# Patient Record
Sex: FEMALE | Race: WHITE | NOT HISPANIC OR LATINO | Employment: OTHER | URBAN - METROPOLITAN AREA
[De-identification: names, ages, dates, MRNs, and addresses within clinical notes are randomized per-mention and may not be internally consistent; named-entity substitution may affect disease eponyms.]

---

## 2017-01-09 ENCOUNTER — TELEPHONE (OUTPATIENT)
Dept: CARDIOLOGY | Facility: CLINIC | Age: 82
End: 2017-01-09

## 2017-01-09 NOTE — TELEPHONE ENCOUNTER
Kristina called stating pt prescripts were being sent to two different pharmacies. Advised refill today was sent to [facility] on [address] only. Thanks, Jane

## 2017-01-11 RX ORDER — APIXABAN 2.5 MG/1
TABLET, FILM COATED ORAL
Qty: 60 TABLET | Refills: 0 | OUTPATIENT
Start: 2017-01-11

## 2017-02-08 RX ORDER — APIXABAN 2.5 MG/1
TABLET, FILM COATED ORAL
Qty: 60 TABLET | Refills: 0 | Status: SHIPPED | OUTPATIENT
Start: 2017-02-08 | End: 2017-03-08 | Stop reason: SDUPTHER

## 2017-03-09 RX ORDER — APIXABAN 2.5 MG/1
TABLET, FILM COATED ORAL
Qty: 60 TABLET | Refills: 2 | Status: SHIPPED | OUTPATIENT
Start: 2017-03-09 | End: 2017-06-08 | Stop reason: SDUPTHER

## 2017-04-25 ENCOUNTER — OFFICE VISIT (OUTPATIENT)
Dept: CARDIOLOGY | Facility: CLINIC | Age: 82
End: 2017-04-25

## 2017-04-25 VITALS
HEART RATE: 76 BPM | WEIGHT: 102 LBS | BODY MASS INDEX: 17.42 KG/M2 | DIASTOLIC BLOOD PRESSURE: 70 MMHG | SYSTOLIC BLOOD PRESSURE: 110 MMHG | HEIGHT: 64 IN

## 2017-04-25 DIAGNOSIS — I48.19 PERSISTENT ATRIAL FIBRILLATION (HCC): Primary | ICD-10-CM

## 2017-04-25 DIAGNOSIS — I36.1 NON-RHEUMATIC TRICUSPID VALVE INSUFFICIENCY: ICD-10-CM

## 2017-04-25 DIAGNOSIS — I34.0 NON-RHEUMATIC MITRAL REGURGITATION: ICD-10-CM

## 2017-04-25 DIAGNOSIS — I50.32 CHRONIC DIASTOLIC CONGESTIVE HEART FAILURE (HCC): ICD-10-CM

## 2017-04-25 PROCEDURE — 93000 ELECTROCARDIOGRAM COMPLETE: CPT | Performed by: INTERNAL MEDICINE

## 2017-04-25 PROCEDURE — 99213 OFFICE O/P EST LOW 20 MIN: CPT | Performed by: INTERNAL MEDICINE

## 2017-04-25 NOTE — PROGRESS NOTES
Subjective:     Encounter Date:04/25/2017      Patient ID: Oneyda Johnson is a 85 y.o. female.    Chief Complaint: The patient is an 85-year-old female with a history of CML, Sjogrens disease, persistent atrial fibrillation and mitral regurgitation, resolved nonischemic cardiomyopathy, chronic diastolic congestive heart failure and pulmonary hypertension, who presents for a followup.  The patient was previously followed by Dr. Varela with [facility] for a new diagnosis of atrial fibrillation. Around the time of her diagnosis she underwent a stress test that was negative for ischemia. Also around that time in 10/2014 she was started on Lopressor and tried on warfarin, and then was ultimately switched to rivaroxaban for anticoagulation. She subsequently had an echocardiogram done in 06/2015 that showed an ejection fraction of 30-35%, severe mitral regurgitation, severe tricuspid regurgitation, along with severe pulmonary hypertension.  She was informed of the results and was told that she may need surgery for her valve issues. Unfortunately, the patient took the news badly and ended up stopping all of her medications.      I started seeing the patient in 09/2015 when she presented with symptoms of dyspnea on exertion and orthopnea.  She appeared to be in acute congestive heart failure when I saw her so I started her on Lasix, restarted her Lopressor for her atrial fibrillation with RVR.  After a discussion regarding anticoagulation, we did end up starting her back on Rivaroxaban, but she developed some issues with her liver enzymes and we eventually switched her to apixaban.   With outpatient management, her volume overload improved and I ended up setting her up for a transesophageal echocardiogram that was performed in 10/2015 to evaluate her mitral regurgitation.   On this study, her ejection fraction was around 40%.  She had mildly reduced RV function with an RV systolic pressure of 30 mmHg  and mild to moderate TR, and moderate mitral regurgitation. As part of an ischemic workup for her heart failure, she underwent a right and left heart catheterization later that same month that showed normal pulmonary pressures and coronary arteries.   At this point I adjusted her medical management by switching her from metoprolol to carvedilol.  We were unable to add an ace inhibitor because of borderline low blood pressures.   With optimal medical management we repeated an echocardiogram in 06/2016 that showed normalization of her left ventricle function to 54%, with borderline dilated right ventricle, dilated left and right atrium, mild to moderate mitral regurgitation and RVSP of 55 mmHg.       Shortly after that the patient ended up being admitted to [facility] for recurrent right sided effusion. She had echocardiograms done there that showed her ejection fraction to be a little bit lower at 50%, and she had markedly elevated pulmonary pressures with an RVSP of 88 mmHg.  She underwent thoracentesis and eventually was sent home with home O2.      When I saw her last in followup she was starting to come off of her home O2.  She had no other significant changes in her medication management.      Today, the patient presents for a routine followup.   Overall, she is doing pretty well.  She does continue to have some dyspnea on exertion, but she has not required any further oxygen.  She denies any chest pain, she denies any palpitations.  She does have some lightheadedness with position changes.  She is on a stable dose of furosemide. She is tolerating her carvedilol and remains on apixaban.   She recently had lab work done through Dr. Valnecia office.  She has had no other significant changes in her medications.        History of Present Illness        Review of Systems   Constitution: Negative for weakness and malaise/fatigue.   HENT: Negative for headaches, hearing loss, hoarse voice, nosebleeds and sore  throat.    Eyes: Negative for pain.   Cardiovascular: Positive for dyspnea on exertion. Negative for chest pain, claudication, cyanosis, irregular heartbeat, leg swelling, near-syncope, orthopnea, palpitations, paroxysmal nocturnal dyspnea and syncope.   Respiratory: Negative for shortness of breath and snoring.    Endocrine: Negative for cold intolerance, heat intolerance, polydipsia, polyphagia and polyuria.   Skin: Negative for itching and rash.   Musculoskeletal: Negative for arthritis, falls, joint pain, joint swelling, muscle cramps, muscle weakness and myalgias.   Gastrointestinal: Negative for constipation, diarrhea, dysphagia, heartburn, hematemesis, hematochezia, melena, nausea and vomiting.   Genitourinary: Positive for frequency. Negative for hematuria and hesitancy.   Neurological: Positive for numbness. Negative for excessive daytime sleepiness, dizziness and light-headedness.   Psychiatric/Behavioral: Negative for depression. The patient is not nervous/anxious.           Current Outpatient Prescriptions:   •  bosutinib (BOSULIF) 100 MG chemo tablet, Take  by mouth daily., Disp: , Rfl:   •  Calcium Carbonate (CALCIUM 600 PO), Take 1 tablet by mouth daily., Disp: , Rfl:   •  carvedilol (COREG) 3.125 MG tablet, Take 1 tablet by mouth 2 (two) times a day., Disp: 60 tablet, Rfl: 11  •  cevimeline (EVOXAC) 30 MG capsule, Take 1 capsule by mouth 2 (two) times a day.  , Disp: , Rfl:   •  clindamycin (CLEOCIN) 300 MG capsule, Take by mouth. Take 2 capsules 1 hour prior to dental appointment., Disp: , Rfl:   •  ELIQUIS 2.5 MG tablet tablet, TAKE 1 TABLET BY MOUTH TWICE DAILY, Disp: 60 tablet, Rfl: 2  •  esomeprazole (NEXIUM) 40 MG packet, Take 40 mg by mouth every morning before breakfast., Disp: , Rfl:   •  FERROUS SULFATE PO, Take 65 mg by mouth., Disp: , Rfl:   •  furosemide (LASIX) 40 MG tablet, Take 1 tablet by mouth Daily., Disp: , Rfl:   •  hydroxychloroquine (PLAQUENIL) 200 MG tablet, Take 1 tablet by  "mouth 2 (two) times a day., Disp: , Rfl:   •  levothyroxine (SYNTHROID, LEVOTHROID) 25 MCG tablet, Take 25 mcg by mouth daily.  , Disp: , Rfl: 3  •  mirtazapine (REMERON) 15 MG tablet, Take 15 mg by mouth nightly.  , Disp: , Rfl: 12  •  vitamin B-12 (CYANOCOBALAMIN) 1000 MCG tablet, Take 1 tablet by mouth daily. As directed., Disp: , Rfl:     Past Medical History:   Diagnosis Date   • Atrial fibrillation    • Cancer     Leukemia   • CHF (congestive heart failure)    • Chronic kidney disease    • History of EKG    • Hypertension     Pulmonay   • Mitral regurgitation    • Mitral valve insufficiency    • Myelogenous leukemia    • Recurrent pleural effusion on right    • Systolic CHF    • Tricuspid valve insufficiency      Past Surgical History:   Procedure Laterality Date   • JOINT REPLACEMENT      Total Hip   • TOE ARTHROPLASTY      For Hammertoe     Family History   Problem Relation Age of Onset   • No Known Problems Mother    • No Known Problems Father    • Heart disease Maternal Aunt      Social History   Substance Use Topics   • Smoking status: Former Smoker   • Smokeless tobacco: Never Used      Comment: Quit in 1999  / Daily caffeine use   • Alcohol use Yes      Comment: one a day           ECG 12 Lead  Date/Time: 4/25/2017 12:12 PM  Performed by: KENISHA OVALLE  Authorized by: KENISHA OVALLE   Comparison: compared with previous ECG   Similar to previous ECG  Rhythm: atrial fibrillation  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 She appears well-developed and well-nourished.   HENT:   Head: Normocephalic and atraumatic.   Eyes: Conjunctivae, EOM and lids are normal. Pupils are equal, round, and reactive to light. "   Neck: Normal range of motion and full passive range of motion without pain. Neck supple. No JVD present. Carotid bruit is not present.   Cardiovascular: Normal rate, S1 normal and S2 normal.  An irregularly irregular rhythm present. Exam reveals no gallop.    No murmur heard.  Pulses:       Radial pulses are 2+ on the right side, and 2+ on the left side.   No bilateral lower extremity edema   Pulmonary/Chest: Effort normal and breath sounds normal.   Abdominal: Soft. Normal appearance.   Lymphadenopathy:     She has no cervical adenopathy.   Neurological: She is alert and oriented to person, place, and time.   Skin: Skin is warm, dry and intact.   Psychiatric: She has a normal mood and affect.       Lab Review:       Assessment:          Diagnosis Plan   1. Persistent atrial fibrillation     2. Non-rheumatic mitral regurgitation     3. Chronic diastolic congestive heart failure     4. Non-rheumatic tricuspid valve insufficiency            Plan:        1. Chronic diastolic congestive heart failure.  Volume status appears stable on her current management, including carvedilol and furosemide.     2. Persistent atrial fibrillation.  She remains in atrial fibrillation today. I think this is becoming more persistent. She is well rate controlled on low dose carvedilol. She is on apixaban for her CHADS-VASc score of 5 at 2.5 mg twice a day due to her weight if less than 60 kilograms, her age greater than 80, and her creatinine of greater than 1.5.     3. Mitral regurgitation.  This is noted to be mild to moderate on her last echocardiogram.     4. Chronic kidney disease. I was able to find labs from 03/01/2017 that show her creatinine is fairly stable at 1.6.  This is all by Dr. Gutiérrez.     5. CML.  She is followed by Hematology for this.          I will plan on seeing the patient back again in six months.         Atrial Fibrillation and Atrial Flutter  Assessment  • The patient has persistent atrial fibrillation  •  This is non-valvular in etiology  • The patient's CHADS2-VASc score is 4  • A [id number] score of 2 or more is considered a high risk for a thromboembolic event  • Apixaban prescribed    Plan  • Continue in atrial fibrillation with rate control  • Continue apixaban for antithrombotic therapy, bleeding issues discussed  • Continue beta blocker for rhythm control

## 2017-05-26 ENCOUNTER — OFFICE (OUTPATIENT)
Dept: URBAN - METROPOLITAN AREA CLINIC 75 | Facility: CLINIC | Age: 82
End: 2017-05-26
Payer: COMMERCIAL

## 2017-05-26 VITALS
WEIGHT: 97 LBS | HEIGHT: 64 IN | SYSTOLIC BLOOD PRESSURE: 120 MMHG | HEART RATE: 96 BPM | DIASTOLIC BLOOD PRESSURE: 58 MMHG

## 2017-05-26 DIAGNOSIS — R63.4 ABNORMAL WEIGHT LOSS: ICD-10-CM

## 2017-05-26 DIAGNOSIS — R13.10 DYSPHAGIA, UNSPECIFIED: ICD-10-CM

## 2017-05-26 DIAGNOSIS — R07.9 CHEST PAIN, UNSPECIFIED: ICD-10-CM

## 2017-05-26 PROCEDURE — 99214 OFFICE O/P EST MOD 30 MIN: CPT | Performed by: INTERNAL MEDICINE

## 2017-06-08 DIAGNOSIS — I48.19 PERSISTENT ATRIAL FIBRILLATION (HCC): ICD-10-CM

## 2017-06-08 DIAGNOSIS — I50.20 CONGESTIVE HEART FAILURE WITH LEFT VENTRICULAR SYSTOLIC DYSFUNCTION (HCC): ICD-10-CM

## 2017-06-08 RX ORDER — APIXABAN 2.5 MG/1
TABLET, FILM COATED ORAL
Qty: 60 TABLET | Refills: 3 | Status: SHIPPED | OUTPATIENT
Start: 2017-06-08 | End: 2017-10-06 | Stop reason: SDUPTHER

## 2017-06-08 RX ORDER — CARVEDILOL 3.12 MG/1
TABLET ORAL
Qty: 60 TABLET | Refills: 3 | Status: SHIPPED | OUTPATIENT
Start: 2017-06-08 | End: 2018-01-02 | Stop reason: SDUPTHER

## 2017-10-10 RX ORDER — APIXABAN 2.5 MG/1
TABLET, FILM COATED ORAL
Qty: 60 TABLET | Refills: 5 | Status: SHIPPED | OUTPATIENT
Start: 2017-10-10 | End: 2018-04-13 | Stop reason: SDUPTHER

## 2017-10-24 ENCOUNTER — OFFICE VISIT (OUTPATIENT)
Dept: CARDIOLOGY | Facility: CLINIC | Age: 82
End: 2017-10-24

## 2017-10-24 VITALS
WEIGHT: 100 LBS | DIASTOLIC BLOOD PRESSURE: 68 MMHG | BODY MASS INDEX: 17.07 KG/M2 | HEIGHT: 64 IN | HEART RATE: 72 BPM | SYSTOLIC BLOOD PRESSURE: 132 MMHG

## 2017-10-24 DIAGNOSIS — I34.0 NON-RHEUMATIC MITRAL REGURGITATION: ICD-10-CM

## 2017-10-24 DIAGNOSIS — I50.32 CHRONIC DIASTOLIC CONGESTIVE HEART FAILURE (HCC): ICD-10-CM

## 2017-10-24 DIAGNOSIS — I48.19 PERSISTENT ATRIAL FIBRILLATION (HCC): Primary | ICD-10-CM

## 2017-10-24 DIAGNOSIS — I36.1 NON-RHEUMATIC TRICUSPID VALVE INSUFFICIENCY: ICD-10-CM

## 2017-10-24 PROCEDURE — 93000 ELECTROCARDIOGRAM COMPLETE: CPT | Performed by: INTERNAL MEDICINE

## 2017-10-24 PROCEDURE — 99213 OFFICE O/P EST LOW 20 MIN: CPT | Performed by: INTERNAL MEDICINE

## 2017-10-24 RX ORDER — BUDESONIDE AND FORMOTEROL FUMARATE DIHYDRATE 160; 4.5 UG/1; UG/1
2 AEROSOL RESPIRATORY (INHALATION)
COMMUNITY
End: 2018-05-08 | Stop reason: ALTCHOICE

## 2017-10-24 NOTE — PROGRESS NOTES
Subjective:     Encounter Date:10/24/2017      Patient ID: Oneyda Johnson is a 85 y.o. female.    Chief Complaint:  History of Present Illness    This is a 85-year-old with a history of CML, Sjogren's disease, persistent atrial fibrillation, moderate mitral regurgitation, resolved nonischemic cardiomyopathy, chronic diastolic congestive heart failure, and pulmonary hypertension, who presents for follow-up.    The patient was previously followed [facility] following a diagnosis of atrial fibrillation.  As part of her workup she underwent a stress test in 10/2014 that was negative for ischemia.  She was started on Lopressor and tried on warfarin for anticoagulation but was ultimately switched to rivaroxaban.  An echocardiogram was subsequently performed in 6/2015 that showed moderately depressed left ventricular systolic function with an ejection fraction of 30-35%, severe mitral regurgitation, severe tricuspid regurgitation, and severe pulmonary hypertension.  The patient was informed of the results of that time and it was mentioned that she may need surgery for her valve issues.  Unfortunately, the patient at that is badly and ended up stopping all of her medications and did not follow-up.    I began seeing the patient when she presented in 9/2015 with symptoms of dyspnea on exertion and orthopnea.  She appears to be in acute congestive heart failure when I saw her at the time.  I started her on furosemide and restarted her metoprolol tartrate for better rate control.  We also discussed anticoagulation and eventually we started her back on rivaroxaban.  However she developed issues with her liver enzymes with this medication so he eventually switch her to low dose apixaban.  With outpatient management, her volume overload eventually improved.  We then pursued a transesophageal echocardiogram in 10/2015 to further evaluate her mitral regurgitation.  On her BHAKTI the ejection fraction  appeared to be around 40%, had a mildly reduced right ventricular function with an RV systolic pressure 30 mmHg, mild to moderate TR, and moderate mitral regurgitation.  Due to her persistently low EF we pursued an ischemic workup with a right and left heart catheterization.  Her pulmonary pressures were normal.  Her coronary arteries showed no coronary artery disease.  At that point I adjusted her medical management by switching her metoprolol to carvedilol.  I was unable to add ACE inhibitor therapy due to borderline low blood pressures.    Fortunately on a repeat echocardiogram in 6/2016 she had normalization of her left ventricular function with an ejection fraction of 54%.  This also showed a borderline dilated right ventricle, dilated left and right atrium, mild to moderate mitral regurgitation, and a right ventricular systolic pressure 55 mmHg.  Shortly after that she ended up being admitted to [facility] for recurrent right-sided effusion.  A repeat echocardiogram done there showed her ejection fraction to be low normal at 50% and with markedly elevated pulmonary pressures with a RVSP of 88 mmHg.  She underwent a thoracentesis during that stay and eventually was sent home with home oxygen.    Since then she is otherwise generally well.  She was eventually weaned off of the home O2.  Today she presents for routine follow-up.  She denies any chest pain, palpitations, PND or orthopnea, presyncope or syncope, or lower extremity edema.  She has some lightheadedness on occasion especially with position changes.  She complains about the significant bruising related to her apixaban.  She occasionally gets out of breath with exertion but this is been stable and unchanged.        Review of Systems   Constitution: Negative for weakness and malaise/fatigue.   HENT: Negative for hearing loss, hoarse voice, nosebleeds and sore throat.    Eyes: Negative for pain.   Cardiovascular: Positive for dyspnea on exertion.  Negative for chest pain, claudication, cyanosis, irregular heartbeat, leg swelling, near-syncope, orthopnea, palpitations, paroxysmal nocturnal dyspnea and syncope.   Respiratory: Negative for shortness of breath and snoring.    Endocrine: Negative for cold intolerance, heat intolerance, polydipsia, polyphagia and polyuria.   Skin: Negative for itching and rash.   Musculoskeletal: Negative for arthritis, falls, joint pain, joint swelling, muscle cramps, muscle weakness and myalgias.   Gastrointestinal: Negative for constipation, diarrhea, dysphagia, heartburn, hematemesis, hematochezia, melena, nausea and vomiting.   Genitourinary: Negative for frequency, hematuria and hesitancy.   Neurological: Positive for light-headedness. Negative for excessive daytime sleepiness, dizziness, headaches and numbness.   Psychiatric/Behavioral: Negative for depression. The patient is not nervous/anxious.           Current Outpatient Prescriptions:   •  bosutinib (BOSULIF) 100 MG chemo tablet, Take  by mouth daily., Disp: , Rfl:   •  budesonide-formoterol (SYMBICORT) 160-4.5 MCG/ACT inhaler, Inhale 2 puffs 2 (Two) Times a Day., Disp: , Rfl:   •  Calcium Carbonate (CALCIUM 600 PO), Take 1 tablet by mouth daily., Disp: , Rfl:   •  carvedilol (COREG) 3.125 MG tablet, TAKE 1 TABLET BY MOUTH TWICE DAILY, Disp: 60 tablet, Rfl: 3  •  cevimeline (EVOXAC) 30 MG capsule, Take 1 capsule by mouth 2 (two) times a day.  , Disp: , Rfl:   •  cholecalciferol (VITAMIN D3) 43726 units capsule, Take 50,000 Units by mouth Daily., Disp: , Rfl:   •  ELIQUIS 2.5 MG tablet tablet, TAKE 1 TABLET BY MOUTH TWICE DAILY, Disp: 60 tablet, Rfl: 5  •  esomeprazole (NEXIUM) 40 MG packet, Take 40 mg by mouth every morning before breakfast., Disp: , Rfl:   •  furosemide (LASIX) 40 MG tablet, Take 1 tablet by mouth Daily., Disp: , Rfl:   •  hydroxychloroquine (PLAQUENIL) 200 MG tablet, Take 1 tablet by mouth 2 (two) times a day., Disp: , Rfl:   •  levothyroxine  "(SYNTHROID, LEVOTHROID) 25 MCG tablet, Take 25 mcg by mouth daily.  , Disp: , Rfl: 3  •  mirtazapine (REMERON) 15 MG tablet, Take 15 mg by mouth nightly.  , Disp: , Rfl: 12  •  vitamin B-12 (CYANOCOBALAMIN) 1000 MCG tablet, Take 1 tablet by mouth daily. As directed., Disp: , Rfl:     Past Medical History:   Diagnosis Date   • Atrial fibrillation    • Cancer     Leukemia   • CHF (congestive heart failure)    • Chronic kidney disease    • History of EKG    • Hypertension     Pulmonay   • Mitral regurgitation    • Mitral valve insufficiency    • Myelogenous leukemia    • Recurrent pleural effusion on right    • Systolic CHF    • Tricuspid valve insufficiency      Past Surgical History:   Procedure Laterality Date   • JOINT REPLACEMENT      Total Hip   • TOE ARTHROPLASTY      For Hammertoe     Family History   Problem Relation Age of Onset   • No Known Problems Mother    • No Known Problems Father    • Heart disease Maternal Aunt      Social History   Substance Use Topics   • Smoking status: Former Smoker   • Smokeless tobacco: Never Used      Comment: Quit in 1999  / Daily caffeine use   • Alcohol use Yes      Comment: one a day           ECG 12 Lead  Date/Time: 10/24/2017 2:04 PM  Performed by: KENISHA OVALLE  Authorized by: KENISHA OVALLE   Comparison: compared with previous ECG   Similar to previous ECG  Rhythm: atrial fibrillation  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 She appears well-developed and well-nourished.   HENT:   Head: Normocephalic and atraumatic.   Eyes: Conjunctivae, EOM and lids are normal. Pupils are equal, round, and reactive to light.   Neck: Normal range of motion and full passive range of motion without pain. " Neck supple. No JVD present. Carotid bruit is not present.   Cardiovascular: Normal rate, regular rhythm, S1 normal and S2 normal.  Exam reveals no gallop.    No murmur heard.  Pulses:       Radial pulses are 2+ on the right side, and 2+ on the left side.   No bilateral lower extremity edema   Pulmonary/Chest: Effort normal and breath sounds normal.   Abdominal: Soft. Normal appearance.   Lymphadenopathy:     She has no cervical adenopathy.   Neurological: She is alert and oriented to person, place, and time.   Skin: Skin is warm, dry and intact.   Psychiatric: She has a normal mood and affect.       Lab Review:       Assessment:          Diagnosis Plan   1. Persistent atrial fibrillation     2. Chronic diastolic congestive heart failure     3. Non-rheumatic mitral regurgitation     4. Non-rheumatic tricuspid valve insufficiency            Plan:       1.  Chronic diastolic congestive heart failure.  Volume status appears stable on her current management.  2.  Chronic atrial fibrillation.  She remains in atrial fibrillation today.  Heart rate is well-controlled on the low-dose of carvedilol.  She is on apixaban for a CHADS-VASc score of 5.  3.  Mitral regurgitation.  Noted to be mild to moderate on her last echocardiogram.  4.  Chronic kidney disease.  5.  Pulmonary hypertension.  In part due to her chronic diastolic congestive heart failure.  6.  CML  7.  Sjogren's disease    We will see the patient back again in 6 months.

## 2018-01-02 DIAGNOSIS — I50.20 CONGESTIVE HEART FAILURE WITH LEFT VENTRICULAR SYSTOLIC DYSFUNCTION (HCC): ICD-10-CM

## 2018-01-02 DIAGNOSIS — I48.19 PERSISTENT ATRIAL FIBRILLATION (HCC): ICD-10-CM

## 2018-01-05 RX ORDER — CARVEDILOL 3.12 MG/1
TABLET ORAL
Qty: 60 TABLET | Refills: 6 | Status: SHIPPED | OUTPATIENT
Start: 2018-01-05

## 2018-04-16 RX ORDER — APIXABAN 2.5 MG/1
TABLET, FILM COATED ORAL
Qty: 60 TABLET | Refills: 0 | Status: SHIPPED | OUTPATIENT
Start: 2018-04-16 | End: 2018-05-13 | Stop reason: SDUPTHER

## 2018-05-08 ENCOUNTER — OFFICE VISIT (OUTPATIENT)
Dept: CARDIOLOGY | Facility: CLINIC | Age: 83
End: 2018-05-08

## 2018-05-08 VITALS
HEIGHT: 64 IN | WEIGHT: 97 LBS | DIASTOLIC BLOOD PRESSURE: 78 MMHG | SYSTOLIC BLOOD PRESSURE: 124 MMHG | BODY MASS INDEX: 16.56 KG/M2 | HEART RATE: 76 BPM

## 2018-05-08 DIAGNOSIS — I34.0 NON-RHEUMATIC MITRAL REGURGITATION: ICD-10-CM

## 2018-05-08 DIAGNOSIS — I36.1 NON-RHEUMATIC TRICUSPID VALVE INSUFFICIENCY: ICD-10-CM

## 2018-05-08 DIAGNOSIS — I50.32 CHRONIC DIASTOLIC CONGESTIVE HEART FAILURE (HCC): Primary | ICD-10-CM

## 2018-05-08 DIAGNOSIS — I48.19 PERSISTENT ATRIAL FIBRILLATION (HCC): ICD-10-CM

## 2018-05-08 PROCEDURE — 93000 ELECTROCARDIOGRAM COMPLETE: CPT | Performed by: INTERNAL MEDICINE

## 2018-05-08 PROCEDURE — 99214 OFFICE O/P EST MOD 30 MIN: CPT | Performed by: INTERNAL MEDICINE

## 2018-05-08 NOTE — PROGRESS NOTES
Subjective:     Encounter Date:05/08/2018      Patient ID: Oneyda Johnson is a 86 y.o. female.    Chief Complaint:  History of Present Illness    This is a 85-year-old with a history of CML, Sjogren's disease, persistent atrial fibrillation, moderate mitral regurgitation, resolved nonischemic cardiomyopathy, chronic diastolic congestive heart failure, and pulmonary hypertension, who presents for follow-up.     The patient was previously followed by Dr. Varela following a diagnosis of atrial fibrillation.  As part of her workup she underwent a stress test in 10/2014 that was negative for ischemia.  She was started on Lopressor and tried on warfarin for anticoagulation but was ultimately switched to rivaroxaban.  An echocardiogram was subsequently performed in 6/2015 that showed moderately depressed left ventricular systolic function with an ejection fraction of 30-35%, severe mitral regurgitation, severe tricuspid regurgitation, and severe pulmonary hypertension.  The patient was informed of the results of that time and it was mentioned that she may need surgery for her valve issues.  Unfortunately, the patient did understand the information given at that time and ended up stopping all of her medications and did not follow-up.     I began seeing the patient when she was referred in 9/2015 with symptoms of dyspnea on exertion and orthopnea.  She appears to be in acute congestive heart failure when I saw her at the time so she was started her on furosemide and restarted on metoprolol tartrate for better rate control of her atrial fibrillation.  We also discussed anticoagulation and started her back on rivaroxaban but this was switched to apixaban after the patient developed issues with her liver enzymes.  With outpatient management, her volume overload eventually improved.  We then pursued a transesophageal echocardiogram in 10/2015 to further evaluate her mitral regurgitation.  On her BHAKTI the ejection fraction  appeared to be around 40%, had a mildly reduced right ventricular function with an RV systolic pressure 30 mmHg, mild to moderate TR, and moderate mitral regurgitation.  Due to her persistently low EF we pursued an ischemic workup with a right and left heart catheterization.  Her pulmonary pressures were normal.  Her coronary arteries showed no coronary artery disease.  At that point I adjusted her medical management by switching her metoprolol to carvedilol but have been unable to add ACE inhibitor therapy due to borderline low blood pressures.     Fortunately on a repeat echocardiogram in 6/2016 she had normalization of her left ventricular function with an ejection fraction of 54%.  This also showed a borderline dilated right ventricle, dilated left and right atrium, mild to moderate mitral regurgitation, and a right ventricular systolic pressure 55 mmHg.  Shortly after that she ended up being admitted to [facility] for recurrent right-sided effusion.  A repeat echocardiogram done there showed her ejection fraction to be low normal at 50% and with markedly elevated pulmonary pressures with a RVSP of 88 mmHg.  She underwent a thoracentesis during that stay and eventually was sent home with home oxygen.    Serum last in 10/2017 and she was doing well overall.  She was weaned off of oxygen and was not having any significant symptoms.  Today she presents for routine 6 month follow-up.  She reports that since the start of the year she's been having more issues with dyspnea on exertion and lower extremity edema.  She also complains of sharp stabbing pain in her bilateral feet that occurs at nighttime and prevents her from sleeping.  She saw Dr. Gutiérrez last week for these symptoms and her furosemide dosage was increased to 60 mg a day plans to follow-up again with her next week.  She reports that she has not seen any improvement in her swelling or her dyspnea with an increased dosage.  She otherwise denies any  chest pain, palpitations, PND or orthopnea, near-syncope or syncope.  She reports that she and her  are planning on moving into an assisted living care into the this week.    Review of Systems   Constitution: Negative for weakness and malaise/fatigue.   HENT: Negative for hearing loss, hoarse voice, nosebleeds and sore throat.    Eyes: Negative for pain.   Cardiovascular: Positive for dyspnea on exertion and leg swelling. Negative for chest pain, claudication, cyanosis, irregular heartbeat, near-syncope, orthopnea, palpitations, paroxysmal nocturnal dyspnea and syncope.   Respiratory: Negative for shortness of breath and snoring.    Endocrine: Negative for cold intolerance, heat intolerance, polydipsia, polyphagia and polyuria.   Skin: Negative for itching and rash.   Musculoskeletal: Negative for arthritis, falls, joint pain, joint swelling, muscle cramps, muscle weakness and myalgias.   Gastrointestinal: Negative for constipation, diarrhea, dysphagia, heartburn, hematemesis, hematochezia, melena, nausea and vomiting.   Genitourinary: Negative for frequency, hematuria and hesitancy.   Neurological: Negative for excessive daytime sleepiness, dizziness, headaches, light-headedness and numbness.        Sharp bilateral feet pain   Psychiatric/Behavioral: Negative for depression. The patient is not nervous/anxious.           Current Outpatient Prescriptions:   •  bosutinib (BOSULIF) 100 MG chemo tablet, Take  by mouth daily., Disp: , Rfl:   •  Calcium Carbonate (CALCIUM 600 PO), Take 1 tablet by mouth daily., Disp: , Rfl:   •  carvedilol (COREG) 3.125 MG tablet, TAKE 1 TABLET BY MOUTH TWICE DAILY, Disp: 60 tablet, Rfl: 6  •  cholecalciferol (VITAMIN D3) 05454 units capsule, Take 50,000 Units by mouth Daily., Disp: , Rfl:   •  ELIQUIS 2.5 MG tablet tablet, TAKE 1 TABLET BY MOUTH TWICE DAILY, Disp: 60 tablet, Rfl: 0  •  esomeprazole (NEXIUM) 40 MG packet, Take 40 mg by mouth every morning before breakfast., Disp: ,  "Rfl:   •  furosemide (LASIX) 40 MG tablet, Take 1 tablet by mouth Daily., Disp: , Rfl:   •  hydroxychloroquine (PLAQUENIL) 200 MG tablet, Take 1 tablet by mouth 2 (two) times a day., Disp: , Rfl:   •  levothyroxine (SYNTHROID, LEVOTHROID) 25 MCG tablet, Take 25 mcg by mouth daily.  , Disp: , Rfl: 3  •  mirtazapine (REMERON) 15 MG tablet, Take 15 mg by mouth nightly.  , Disp: , Rfl: 12    Past Medical History:   Diagnosis Date   • Atrial fibrillation    • Cancer     Leukemia   • CHF (congestive heart failure)    • Chronic kidney disease    • History of EKG    • Hypertension     Pulmonay   • Mitral regurgitation    • Mitral valve insufficiency    • Myelogenous leukemia    • Recurrent pleural effusion on right    • Systolic CHF    • Tricuspid valve insufficiency      Past Surgical History:   Procedure Laterality Date   • JOINT REPLACEMENT      Total Hip   • TOE ARTHROPLASTY      For Hammertoe     Family History   Problem Relation Age of Onset   • No Known Problems Mother    • No Known Problems Father    • Heart disease Maternal Aunt      Social History   Substance Use Topics   • Smoking status: Former Smoker   • Smokeless tobacco: Never Used      Comment: Quit in 1999  / Daily caffeine use   • Alcohol use Yes      Comment: one a day           ECG 12 Lead  Date/Time: 5/8/2018 3:58 PM  Performed by: KENISHA OVALLE  Authorized by: KENISHA OVALLE   Comparison: compared with previous ECG   Rhythm: atrial fibrillation  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 She appears well-developed and well-nourished.   HENT:   Head: Normocephalic and atraumatic.   Eyes: Conjunctivae, EOM and lids are normal. Pupils are equal, round, and " reactive to light.   Neck: Normal range of motion and full passive range of motion without pain. Neck supple. No JVD present. Carotid bruit is not present.   Cardiovascular: Normal rate, regular rhythm, S1 normal and S2 normal.  Exam reveals no gallop.    No murmur heard.  Pulses:       Radial pulses are 2+ on the right side, and 2+ on the left side.   No bilateral lower extremity edema   Pulmonary/Chest: Effort normal and breath sounds normal.   Abdominal: Soft. Normal appearance.   Lymphadenopathy:     She has no cervical adenopathy.   Neurological: She is alert and oriented to person, place, and time.   Skin: Skin is warm, dry and intact.   Psychiatric: She has a normal mood and affect.       Lab Review:       Assessment:          Diagnosis Plan   1. Chronic diastolic congestive heart failure     2. Persistent atrial fibrillation     3. Non-rheumatic mitral regurgitation     4. Non-rheumatic tricuspid valve insufficiency            Plan:       1.  Chronic diastolic congestive heart failure.  She is evidence of some mild volume overload.  I recommended that she try increasing her furosemide to 40 mg twice a day for the next 3-4 days to see if this will help with her pedal edema and her dyspnea.  She has follow-up already with Dr. Gutiérrez with labs.  2.  Chronic atrial fibrillation.  Remains well rate controlled on carvedilol.  She is on chronic anticoagulation with apixaban.  3.  Mitral regurgitation.  Mild to moderate her most recent echocardiogram.  4.  Chronic kidney disease.  Has follow-up with labs next week with Dr. Gutiérrez on higher dose of furosemide.   5.  Pulmonary hypertension.  This is in part due to chronic diastolic congestive heart failure.    6.  CML  7.  Sjogren's disease    Plan on seeing the patient back again in 3 months.    Atrial Fibrillation and Atrial Flutter  Assessment  • The patient has permanent atrial fibrillation  • This is non-valvular in etiology  • The patient's CHADS2-VASc  score is 3  • A [id number] score of 2 or more is considered a high risk for a thromboembolic event  • Apixaban prescribed    Plan  • Continue in atrial fibrillation with rate control  • Continue apixaban for antithrombotic therapy, bleeding issues discussed  • Continue beta blocker for rate control    Heart Failure  Assessment  • NYHA class III-A - There is limitation of physical activity. The patient is comfortable at rest, but ordinary activity causes fatigue, palpitations or shortness of breath.  • ACE inhibitor not prescribed for medical reasons  • Beta blocker prescribed  • Diuretics prescribed  • Angiotensin receptor blocker (ARB) not prescribed for medical reasons  • Calcium channel blockers not prescribed  • Left ventricular function is normal by qualitative assessment    Plan  • The patient has received heart failure education on the following topics: dietary sodium restriction  • The heart failure care plan was discussed with the patient today including: up-titrating HF medications    Subjective/Objective    • Physical exam findings negative for elevated JVP.

## 2018-05-14 RX ORDER — APIXABAN 2.5 MG/1
TABLET, FILM COATED ORAL
Qty: 60 TABLET | Refills: 2 | Status: SHIPPED | OUTPATIENT
Start: 2018-05-14

## 2021-03-09 DIAGNOSIS — Z23 IMMUNIZATION DUE: ICD-10-CM
